# Patient Record
Sex: MALE | URBAN - METROPOLITAN AREA
[De-identification: names, ages, dates, MRNs, and addresses within clinical notes are randomized per-mention and may not be internally consistent; named-entity substitution may affect disease eponyms.]

---

## 2023-04-12 ENCOUNTER — EMERGENCY (EMERGENCY)
Facility: HOSPITAL | Age: 28
LOS: 1 days | Discharge: HOME | End: 2023-04-12
Admitting: EMERGENCY MEDICINE
Payer: SELF-PAY

## 2023-04-12 VITALS
TEMPERATURE: 98 F | DIASTOLIC BLOOD PRESSURE: 60 MMHG | HEART RATE: 80 BPM | OXYGEN SATURATION: 98 % | SYSTOLIC BLOOD PRESSURE: 120 MMHG | RESPIRATION RATE: 18 BRPM

## 2023-04-12 DIAGNOSIS — M79.10 MYALGIA, UNSPECIFIED SITE: ICD-10-CM

## 2023-04-12 DIAGNOSIS — Z59.00 HOMELESSNESS UNSPECIFIED: ICD-10-CM

## 2023-04-12 DIAGNOSIS — Z00.00 ENCOUNTER FOR GENERAL ADULT MEDICAL EXAMINATION WITHOUT ABNORMAL FINDINGS: ICD-10-CM

## 2023-04-12 DIAGNOSIS — Z88.0 ALLERGY STATUS TO PENICILLIN: ICD-10-CM

## 2023-04-12 PROCEDURE — 99053 MED SERV 10PM-8AM 24 HR FAC: CPT

## 2023-04-12 PROCEDURE — 99284 EMERGENCY DEPT VISIT MOD MDM: CPT

## 2023-04-12 SDOH — ECONOMIC STABILITY - HOUSING INSECURITY: HOMELESSNESS UNSPECIFIED: Z59.00

## 2023-04-12 NOTE — ED PROVIDER NOTE - OBJECTIVE STATEMENT
27-year-old male, currently undomiciled, unspecified psychiatric history, presenting to the emergency department requesting food, socks, and baby wipes for cleaning himself up.  Patient endorses to triage nurse that he has generalized body aches but does not specify that to me on exam.

## 2023-04-12 NOTE — ED ADULT NURSE NOTE - SUICIDE SCREENING QUESTION 1
Patient Returning Call  Reason for call:  Patient returning call to check on the status of this request. I was not able to fill these medications in [place] and I am requesting they be sent to [facility]'s in [place].    Information relayed to patient:  Pending providers review and approval.  Patient has additional questions:  Yes  If YES, what are your questions/concerns:  Can you let Nya Mata MD know this is an urgent matter and this medication also effects my bipolar depression as well.  Okay to leave a detailed message?: Yes   No

## 2023-04-12 NOTE — ED PROVIDER NOTE - PATIENT PORTAL LINK FT
You can access the FollowMyHealth Patient Portal offered by [facility] by registering at the following website: [email or web address]. By joining [employer]’s FollowMyHealth portal, you will also be able to view your health information using other applications (apps) compatible with our system.

## 2023-04-12 NOTE — ED PROVIDER NOTE - NSFOLLOWUPINSTRUCTIONS_ED_ALL_ED_FT
You need to go to the [facility] that has outpatient clinics available for individuals live on the street or in shelters.  [facility] also has resources for in an outpatient detox.  Please follow-up with them for further evaluation.  You will find their information below.    [facility]: Emergency Room  [address] [place] [address]  [place], [place] · [phone]    [facility]  [address], [place] [address]  Phone: [phone]

## 2023-04-12 NOTE — ED ADULT NURSE NOTE - NSFALLRSKASSESASSIST_ED_ALL_ED
Attempted to contact the patient by phone; a message was left regarding biospy results ready to discuss.   no

## 2023-04-12 NOTE — ED PROVIDER NOTE - CLINICAL SUMMARY MEDICAL DECISION MAKING FREE TEXT BOX
27-year-old male, currently undomiciled, unspecified psychiatric history, presenting to the emergency department requesting food, socks, and baby wipes for cleaning himself up. Patient's vitals reviewed and they are found to be stable.  No red flags noted on exam.  Patient given food and toiletries.  He will be directed to the [facility] which has more resources as a [facility].  Patient is well-appearing and stable on discharge.

## 2023-04-12 NOTE — ED ADULT NURSE NOTE - OBJECTIVE STATEMENT
28 y/o male patient c/o body aches and requesting for food and drinks. patient is alert verbal oriented x3 able to make needs known. patient in no visible distress.

## 2024-11-23 ENCOUNTER — EMERGENCY (EMERGENCY)
Facility: HOSPITAL | Age: 29
LOS: 1 days | Discharge: LEFT BEFORE DISCHARGE | End: 2024-11-23
Admitting: STUDENT IN AN ORGANIZED HEALTH CARE EDUCATION/TRAINING PROGRAM
Payer: SELF-PAY

## 2024-11-23 VITALS
RESPIRATION RATE: 16 BRPM | DIASTOLIC BLOOD PRESSURE: 74 MMHG | SYSTOLIC BLOOD PRESSURE: 131 MMHG | HEART RATE: 84 BPM | OXYGEN SATURATION: 96 % | TEMPERATURE: 98 F

## 2024-11-23 PROCEDURE — L9991: CPT

## 2024-11-24 PROBLEM — Z78.9 OTHER SPECIFIED HEALTH STATUS: Chronic | Status: ACTIVE | Noted: 2023-04-12

## 2024-11-26 DIAGNOSIS — Z53.21 PROCEDURE AND TREATMENT NOT CARRIED OUT DUE TO PATIENT LEAVING PRIOR TO BEING SEEN BY HEALTH CARE PROVIDER: ICD-10-CM

## 2024-11-26 DIAGNOSIS — R21 RASH AND OTHER NONSPECIFIC SKIN ERUPTION: ICD-10-CM
